# Patient Record
Sex: FEMALE | Race: WHITE
[De-identification: names, ages, dates, MRNs, and addresses within clinical notes are randomized per-mention and may not be internally consistent; named-entity substitution may affect disease eponyms.]

---

## 2018-06-26 ENCOUNTER — HOSPITAL ENCOUNTER (EMERGENCY)
Dept: HOSPITAL 74 - JER | Age: 33
LOS: 1 days | Discharge: HOME | End: 2018-06-27
Payer: COMMERCIAL

## 2018-06-26 VITALS — SYSTOLIC BLOOD PRESSURE: 123 MMHG | HEART RATE: 110 BPM | TEMPERATURE: 98.8 F | DIASTOLIC BLOOD PRESSURE: 54 MMHG

## 2018-06-26 VITALS — BODY MASS INDEX: 32.1 KG/M2

## 2018-06-26 DIAGNOSIS — Z3A.16: ICD-10-CM

## 2018-06-26 DIAGNOSIS — O26.892: Primary | ICD-10-CM

## 2018-06-26 LAB
ALBUMIN SERPL-MCNC: 3.2 G/DL (ref 3.4–5)
ALP SERPL-CCNC: 74 U/L (ref 45–117)
ALT SERPL-CCNC: 27 U/L (ref 12–78)
ANION GAP SERPL CALC-SCNC: 7 MMOL/L (ref 8–16)
AST SERPL-CCNC: 25 U/L (ref 15–37)
BASOPHILS # BLD: 0.4 % (ref 0–2)
BILIRUB SERPL-MCNC: 0.3 MG/DL (ref 0.2–1)
BUN SERPL-MCNC: 4 MG/DL (ref 7–18)
CALCIUM SERPL-MCNC: 9.3 MG/DL (ref 8.5–10.1)
CHLORIDE SERPL-SCNC: 104 MMOL/L (ref 98–107)
CO2 SERPL-SCNC: 24 MMOL/L (ref 21–32)
CREAT SERPL-MCNC: 0.5 MG/DL (ref 0.55–1.02)
DEPRECATED RDW RBC AUTO: 14.5 % (ref 11.6–15.6)
EOSINOPHIL # BLD: 0.6 % (ref 0–4.5)
GLUCOSE SERPL-MCNC: 91 MG/DL (ref 74–106)
HCT VFR BLD CALC: 32.3 % (ref 32.4–45.2)
HGB BLD-MCNC: 10.8 GM/DL (ref 10.7–15.3)
LYMPHOCYTES # BLD: 22.8 % (ref 8–40)
MCH RBC QN AUTO: 28.1 PG (ref 25.7–33.7)
MCHC RBC AUTO-ENTMCNC: 33.5 G/DL (ref 32–36)
MCV RBC: 83.9 FL (ref 80–96)
MONOCYTES # BLD AUTO: 5.9 % (ref 3.8–10.2)
NEUTROPHILS # BLD: 70.3 % (ref 42.8–82.8)
PLATELET # BLD AUTO: 353 K/MM3 (ref 134–434)
PMV BLD: 7.7 FL (ref 7.5–11.1)
POTASSIUM SERPLBLD-SCNC: 4.7 MMOL/L (ref 3.5–5.1)
PROT SERPL-MCNC: 7.6 G/DL (ref 6.4–8.2)
RBC # BLD AUTO: 3.85 M/MM3 (ref 3.6–5.2)
SODIUM SERPL-SCNC: 135 MMOL/L (ref 136–145)
WBC # BLD AUTO: 9.3 K/MM3 (ref 4–10)

## 2018-06-26 NOTE — PDOC
Rapid Medical Evaluation


Time Seen by Provider: 06/26/18 20:29


Medical Evaluation: 


 Allergies











Allergy/AdvReac Type Severity Reaction Status Date / Time


 


No Known Allergies Allergy   Verified 04/20/18 21:21











06/26/18 20:32


Pt presents for 2 hours of lower abdominal pain. Pt is 16 weeks pregnant. 

States it feels like a cramping pain. Denies fevers, chills, vaginal bleeding.


Exam: Pt appears anxious. Sitting in wheel chair. Suprapubic tenderness on 

exam. abdomen soft non-distended no rebound or guarding


Orders: Labs, Pregnancy US, Tyenol


Pt to proceed to ED for further evaluation





**Discharge Disposition





- Diagnosis


Abdominal pain during pregnancy


Qualifiers:


 Trimester: second trimester Qualified Code(s): O26.892 - Other specified 

pregnancy related conditions, second trimester; R10.9 - Unspecified abdominal 

pain








- Referrals





- Patient Instructions





- Post Discharge Activity

## 2018-06-26 NOTE — PDOC
History of Present Illness





- General


Chief Complaint: Pain


Stated Complaint: PAIN


Time Seen by Provider: 06/26/18 20:29


History Source: Patient





- History of Present Illness


Initial Comments: 





06/26/18 23:49


32 year old 16 week pregnant female with pelvic pain x1 day. denies urinary 

symptoms


06/27/18 00:07








Past History





- Past Medical History


Allergies/Adverse Reactions: 


 Allergies











Allergy/AdvReac Type Severity Reaction Status Date / Time


 


No Known Allergies Allergy   Verified 06/26/18 20:33











COPD: No


Other medical history: Pt denies





- Surgical History


Appendectomy: Yes


Cardiac Surgery: Yes (heart surgery at 3 years old)





- Suicide/Smoking/Psychosocial Hx


Smoking History: Never smoked


Have you smoked in the past 12 months: No


Information on smoking cessation initiated: No


Hx Alcohol Use: No


Drug/Substance Use Hx: No


Substance Use Type: None





*Physical Exam





- Vital Signs


 Last Vital Signs











Temp Pulse Resp BP Pulse Ox


 


 98.8 F   110 H  18   123/54   99 


 


 06/26/18 20:34  06/26/18 20:34  06/26/18 20:34  06/26/18 20:34  06/26/18 20:34














- Physical Exam


General Appearance: Yes: Appropriately Dressed


Respiratory/Chest: positive: Lungs Clear, Normal Breath Sounds


Female Pelvic Exam: positive: normal external exam, cervical os closed, other (

white discharge in vaginal vault)


Gastrointestinal/Abdominal: positive: Normal Bowel Sounds, Soft


Musculoskeletal: positive: Normal Inspection.  negative: CVA Tenderness


Extremity: positive: Normal Capillary Refill


Integumentary: positive: Normal Color, Dry, Warm


Neurologic: positive: Fully Oriented, Alert, Normal Mood/Affect





ED Treatment Course





- LABORATORY


CBC & Chemistry Diagram: 


 06/26/18 21:38





 06/26/18 21:38





- ADDITIONAL ORDERS


Additional order review: 


 Laboratory  Results











  06/26/18





  21:38


 


Sodium  135 L


 


Potassium  4.7


 


Chloride  104


 


Carbon Dioxide  24


 


Anion Gap  7 L


 


BUN  4 L


 


Creatinine  0.5 L


 


Creat Clearance w eGFR  > 60


 


Random Glucose  91


 


Calcium  9.3


 


Total Bilirubin  0.3


 


AST  25


 


ALT  27


 


Albumin  3.2 L








 











  06/26/18





  21:38


 


RBC  3.85


 


MCV  83.9


 


MCHC  33.5


 


RDW  14.5


 


MPV  7.7


 


Neutrophils %  70.3


 


Lymphocytes %  22.8


 


Monocytes %  5.9


 


Eosinophils %  0.6


 


Basophils %  0.4














- Medications


Given in the ED: 


ED Medications














Discontinued Medications














Generic Name Dose Route Start Last Admin





  Trade Name Nelsy  PRN Reason Stop Dose Admin


 


Acetaminophen  650 mg  06/26/18 20:36  06/26/18 20:37





  Tylenol -  PO  06/26/18 20:37  650 mg





  ONCE ONE   Administration





     





     





     





     














*DC/Admit/Observation/Transfer


Diagnosis at time of Disposition: 


Abdominal pain during pregnancy


Qualifiers:


 Trimester: second trimester Qualified Code(s): O26.892 - Other specified 

pregnancy related conditions, second trimester








- Discharge Dispostion


Disposition: HOME





- Referrals


Referrals: 


ON STAFF,NOT [Primary Care Provider] - 





- Patient Instructions


Printed Discharge Instructions:  DI for Pelvic Pain


Additional Instructions: 


drink plenty of fluids





follow up with ob/gyn as soon as possible, 








return to the ER if worsening  symptoms, vaginal bleeding, severe abdominal pain

, fever 





- Post Discharge Activity

## 2018-06-26 NOTE — PDOC
*Physical Exam





- Vital Signs


 Last Vital Signs











Temp Pulse Resp BP Pulse Ox


 


 98.8 F   110 H  18   123/54   99 


 


 06/26/18 20:34  06/26/18 20:34  06/26/18 20:34  06/26/18 20:34  06/26/18 20:34














ED Treatment Course





- LABORATORY


CBC & Chemistry Diagram: 


 06/26/18 21:38





 06/26/18 21:38





- ADDITIONAL ORDERS


Additional order review: 


 Laboratory  Results











  06/26/18





  21:38


 


Sodium  135 L


 


Potassium  4.7


 


Chloride  104


 


Carbon Dioxide  24


 


Anion Gap  7 L


 


BUN  4 L


 


Creatinine  0.5 L


 


Creat Clearance w eGFR  > 60


 


Random Glucose  91


 


Calcium  9.3


 


Total Bilirubin  0.3


 


AST  25


 


ALT  27


 


Alkaline Phosphatase  74


 


Total Protein  7.6


 


Albumin  3.2 L


 


Beta HCG, Quant  20283.7








 











  06/26/18





  21:38


 


RBC  3.85


 


MCV  83.9


 


MCHC  33.5


 


RDW  14.5


 


MPV  7.7


 


Neutrophils %  70.3


 


Lymphocytes %  22.8


 


Monocytes %  5.9


 


Eosinophils %  0.6


 


Basophils %  0.4














- Medications


Given in the ED: 


ED Medications














Discontinued Medications














Generic Name Dose Route Start Last Admin





  Trade Name Sanchoq  PRN Reason Stop Dose Admin


 


Acetaminophen  650 mg  06/26/18 20:36  06/26/18 20:37





  Tylenol -  PO  06/26/18 20:37  650 mg





  ONCE ONE   Administration





     





     





     





     














Medical Decision Making





- Medical Decision Making





06/26/18 23:03


agree with care from CORY Huerta





*DC/Admit/Observation/Transfer


Diagnosis at time of Disposition: 


Abdominal pain during pregnancy


Qualifiers:


 Trimester: second trimester Qualified Code(s): O26.892 - Other specified 

pregnancy related conditions, second trimester








- Referrals


Referrals: 


ON STAFF,NOT [Primary Care Provider] - 





- Patient Instructions





- Post Discharge Activity

## 2018-06-27 LAB
APPEARANCE UR: CLEAR
BILIRUB UR STRIP.AUTO-MCNC: NEGATIVE MG/DL
COLOR UR: (no result)
KETONES UR QL STRIP: NEGATIVE
LEUKOCYTE ESTERASE UR QL STRIP.AUTO: NEGATIVE
NITRITE UR QL STRIP: NEGATIVE
PH UR: 6 [PH] (ref 5–8)
PROT UR QL STRIP: NEGATIVE
PROT UR QL STRIP: NEGATIVE
SP GR UR: 1 (ref 1–1.03)
UROBILINOGEN UR STRIP-MCNC: NEGATIVE MG/DL (ref 0.2–1)